# Patient Record
(demographics unavailable — no encounter records)

---

## 2025-03-20 NOTE — HISTORY OF PRESENT ILLNESS
[FreeTextEntry1] : Since his last visit,   Activity:   Pt. denies any chest pain, dyspnea, orthopnea, PND, palpitations, lightheadedness, syncope or lower extremity edema.   =================== Labs/Diagnostics: 2023 A1c: 5.6% Lipid profile: T, TC: 218, HDL: 78, LDL: 121* K/Creat: 4.4/1.12  Echo (2023): EF 65-70%, AA repair, no sig valvular disease  Echo (2022): minimal MR, normal LVSF with EF 60%, mild-moderate [place]  Cath (): normal coronaries, mLAD bridge

## 2025-03-20 NOTE — ASSESSMENT
[FreeTextEntry1] : Pt. is a 51-year-old M with PMHx of HTN, HLD, and aortic aneurysm (s/p valve sparing aortic root, ascending aortograft with cardiopulmonary bypass with valve suspension and coronary reconstruction 4/22/19 with Dr. Dukes) who presents today for follow-up.  Patient is 6 years post aortic aneurysm repair patient's blood pressure is well-controlled today patient has a myocardial bridge but is asymptomatic we will schedule follow-up echocardiogram to assess his root and aortic valve  Patient's blood pressure is controlled with the losartan hydrochlorothiazide and metoprolol  Blood work today to assess lipid status as well as liver and kidney function on multiple medications

## 2025-03-20 NOTE — REASON FOR VISIT
[Structural Heart and Valve Disease] : structural heart and valve disease [Hyperlipidemia] : hyperlipidemia [Hypertension] : hypertension [FreeTextEntry1] : Pt. is a 51-year-old M with PMHx of HTN, HLD, and aortic aneurysm (s/p valve sparing aortic root, ascending aortograft with cardiopulmonary bypass with valve suspension and coronary reconstruction 4/22/19 with Dr. Dukes) who presents today for follow-up.

## 2025-06-05 NOTE — DATA REVIEWED
[FreeTextEntry1] : 5/3/24 TTE: 1. Left ventricular wall thickness is mildly increased. Left ventricular systolic function is normal with an ejection fraction of 61 % by Cuevas's method of disks. 2. Normal left ventricular diastolic function. 3. Normal right ventricular cavity size, with normal wall thickness, and normal systolic function. 4. Ascending aorta repair. 5. Mild to moderate pulmonic regurgitation. 6. Trace to mild mitral regurgitation. 7. No pericardial effusion seen.  CTA chest 6/4/20:  normal caliber native thoracic aorta without dissection. intact proximal and distal anastomoses without stenosis or pseudoaneurysm.   Echocardiogram 6/4/20: no AS or AI. EF 60%.   TTE 4/20/2022: no AI. normal EF. minimal mitral regurgitation. mild to moderate pulmonic regurgitation.

## 2025-06-05 NOTE — PROCEDURE
[FreeTextEntry1] : \par  Patient was advised to view the educational video prior to this visit regarding aortic pathology, risk factors, surgical procedures, and lifestyle modifications. Video can be retrieved at <[email or web address].\par

## 2025-06-05 NOTE — PROCEDURE
[FreeTextEntry1] : \par  Patient was advised to view the educational video prior to this visit regarding aortic pathology, risk factors, surgical procedures, and lifestyle modifications. Video can be retrieved at <[email or web address].\par

## 2025-06-05 NOTE — PROCEDURE
[FreeTextEntry1] : \par  Patient was advised to view the educational video prior to this visit regarding aortic pathology, risk factors, surgical procedures, and lifestyle modifications. Video can be retrieved at <[email or web address].\par

## 2025-06-05 NOTE — END OF VISIT
[FreeTextEntry3] : I, ISSA Yadav personally performed the evaluation and management (E/M) services for this established patient who presents today with (a) new problem(s)/exacerbation of (an) existing condition(s).  That E/M includes conducting the clinically appropriate interval history &/or exam, assessing all new/exacerbated conditions, and establishing a new plan of care.  Today, my MARE, was here to observe &/or participate in the visit & follow plan of care established by me.

## 2025-06-05 NOTE — PROCEDURE
[FreeTextEntry1] : \par  Patient was advised to view the educational video prior to this visit regarding aortic pathology, risk factors, surgical procedures, and lifestyle modifications. Video can be retrieved at <[email or web address].\par

## 2025-06-05 NOTE — ASSESSMENT
[FreeTextEntry1] : I have reviewed the patient's medical records, diagnostic images during the time of this office consultation and have made the following recommendation. TTE was completed of the thoracic aorta. The report was reviewed and noted in the chart, I independently reviewed and interpreted images and report and I reviewed the films/CD and agree. The surgical repair is intact and stable.  Plan - Follow up [facility] year with TTE.  - Continue medication regimen. - Follow up with cardiologist and PCP. - Blood pressure management.

## 2025-06-05 NOTE — HISTORY OF PRESENT ILLNESS
[FreeTextEntry1] : 53-year-old male with past medical history of hypertension and aortic aneurysm, s/p valve sparing aortic root, ascending autograft with valve suspension and coronary reconstruction on 4/22/19, presents for a 3 year follow up visit with repeat diagnostic imaging. Patient followed by Dr. Petty Lomeli.   4/1/25 TTE: 1. Left ventricular cavity is normal in size. Left ventricular wall thickness is normal. Left ventricular systolic function is normal with an ejection fraction of 64 % by Cuevas's method of disks. There are no regional wall motion abnormalities seen. 2. Normal left ventricular diastolic function, with normal left ventricular filling pressure. 3. Normal right ventricular cavity size, with normal wall thickness, and normal right ventricular systolic function. 4. Mild to moderate pulmonic regurgitation. 5. No pericardial effusion seen. 6. Echogenic graft material noted in the proximal ascending aorta consistent with known ascending aorta replacement.  Patient is doing well and denies recent hospitalization, ER visits, or surgeries. He  denies fever, chills, fatigue, headache, blurred vision, dizziness, syncope, chest pain, palpitations, shortness of breath, orthopnea, paroxysmal nocturnal dyspnea, nausea, vomiting, abdominal pain, back pain, BRBPR or swelling to legs.